# Patient Record
Sex: FEMALE | Race: WHITE | URBAN - NONMETROPOLITAN AREA
[De-identification: names, ages, dates, MRNs, and addresses within clinical notes are randomized per-mention and may not be internally consistent; named-entity substitution may affect disease eponyms.]

---

## 2017-06-12 RX ORDER — AMOXICILLIN 875 MG/1
875 TABLET, COATED ORAL 2 TIMES DAILY
Qty: 20 TABLET | Refills: 0 | Status: SHIPPED | OUTPATIENT
Start: 2017-06-12

## 2019-04-08 ENCOUNTER — OFFICE VISIT (OUTPATIENT)
Dept: PSYCHIATRY | Age: 39
End: 2019-04-08
Payer: COMMERCIAL

## 2019-04-08 VITALS
WEIGHT: 164 LBS | OXYGEN SATURATION: 97 % | HEIGHT: 64 IN | BODY MASS INDEX: 28 KG/M2 | HEART RATE: 109 BPM | SYSTOLIC BLOOD PRESSURE: 125 MMHG | DIASTOLIC BLOOD PRESSURE: 86 MMHG

## 2019-04-08 DIAGNOSIS — F41.9 ANXIETY: Primary | ICD-10-CM

## 2019-04-08 PROCEDURE — 90791 PSYCH DIAGNOSTIC EVALUATION: CPT | Performed by: PSYCHOLOGIST

## 2019-04-08 NOTE — PROGRESS NOTES
Behavioral Health Consultation  [place] Psy.D.  2019  3:04 PM      Time spent with Patient: 2:30-3:15  This is patient's first       Reason for Consult:  anxiety and possible ADHD  Referring Provider: No referring provider defined for this encounter. Patient was provided informed consent for the assessment clinic. Discussed with patient model of service to include the limits of confidentiality (i.e. abuse reporting, suicide intervention, etc.)   Discussed no show and late cancellation policy. Patient indicated understanding. Feedback given to referring provider. S:  Patient presents with complex symptom presentation. She states that she was first prescribed stimulant medication as an adult. She found that while taking the medication she could focus and was more productive at work and school. She stopped the medication because she was having heart palpitations and was worried given family history of heart disease (mom  at 52). Without medication, patient indicates that she is struggling to focus and accomplish tasks. However, she also reports significant symptoms of anxiety.   She reports that she worries frequently, that she is \"wound up like a clock,\" and that she has experienced anxiety attacks in the past.    O:  MSE:    Appearance    alert, cooperative  Appetite normal  Sleep disturbance No  Fatigue Yes  Loss of pleasure Yes  Impulsive behavior No  Speech    normal rate and normal volume  Mood    Euthymic   Affect    normal affect  Thought Content    intact  Thought Process    linear  Associations    logical connections  Attention/Concentration    intact  Morbid ideation No  Suicide Assessment    no suicidal ideation    History:    Medications:   Current Outpatient Medications   Medication Sig Dispense Refill    amoxicillin (AMOXIL) 875 MG tablet Take 1 tablet by mouth 2 times daily 20 tablet 0    amphetamine-dextroamphetamine (ADDERALL) 20 MG tablet   0    montelukast (SINGULAIR) 10 MG tablet   11     No current facility-administered medications for this visit. Social History:   Social History     Socioeconomic History    Marital status:      Spouse name: Not on file    Number of children: Not on file    Years of education: Not on file    Highest education level: Not on file   Occupational History    Not on file   Social Needs    Financial resource strain: Not on file    Food insecurity:     Worry: Not on file     Inability: Not on file    Transportation needs:     Medical: Not on file     Non-medical: Not on file   Tobacco Use    Smoking status: Current Every Day Smoker     Packs/day: 1.00    Smokeless tobacco: Never Used   Substance and Sexual Activity    Alcohol use: Yes    Drug use: No    Sexual activity: Not on file   Lifestyle    Physical activity:     Days per week: Not on file     Minutes per session: Not on file    Stress: Not on file   Relationships    Social connections:     Talks on phone: Not on file     Gets together: Not on file     Attends Mu-ism service: Not on file     Active member of club or organization: Not on file     Attends meetings of clubs or organizations: Not on file     Relationship status: Not on file    Intimate partner violence:     Fear of current or ex partner: Not on file     Emotionally abused: Not on file     Physically abused: Not on file     Forced sexual activity: Not on file   Other Topics Concern    Not on file   Social History Narrative    Not on file       TOBACCO:   reports that she has been smoking. She has been smoking about 1.00 pack per day. She has never used smokeless tobacco.  ETOH:   reports that she drinks alcohol. Family History:   Family History   Problem Relation Age of Onset    Cancer Maternal Grandfather         pancreatic         A:  Patient reports significant symptoms of anxiety and problems focusing.   It is difficult to determine from self report if these problems are due to anxiety or

## 2023-11-02 ENCOUNTER — HOSPITAL ENCOUNTER (EMERGENCY)
Age: 43
Discharge: HOME | End: 2023-11-02
Payer: COMMERCIAL

## 2023-11-02 ENCOUNTER — APPOINTMENT (OUTPATIENT)
Dept: GENERAL RADIOLOGY | Age: 43
End: 2023-11-02
Payer: COMMERCIAL

## 2023-11-02 VITALS
HEIGHT: 63 IN | HEART RATE: 82 BPM | TEMPERATURE: 97.7 F | WEIGHT: 135 LBS | SYSTOLIC BLOOD PRESSURE: 120 MMHG | RESPIRATION RATE: 16 BRPM | DIASTOLIC BLOOD PRESSURE: 86 MMHG | BODY MASS INDEX: 23.92 KG/M2 | OXYGEN SATURATION: 98 %

## 2023-11-02 DIAGNOSIS — S92.501A CLOSED FRACTURE OF PHALANX OF RIGHT FIFTH TOE, INITIAL ENCOUNTER: Primary | ICD-10-CM

## 2023-11-02 PROCEDURE — 6370000000 HC RX 637 (ALT 250 FOR IP): Performed by: PHYSICIAN ASSISTANT

## 2023-11-02 PROCEDURE — 99283 EMERGENCY DEPT VISIT LOW MDM: CPT

## 2023-11-02 PROCEDURE — 73630 X-RAY EXAM OF FOOT: CPT

## 2023-11-02 RX ORDER — HYDROCODONE BITARTRATE AND ACETAMINOPHEN 5; 325 MG/1; MG/1
1 TABLET ORAL ONCE
Status: COMPLETED | OUTPATIENT
Start: 2023-11-02 | End: 2023-11-02

## 2023-11-02 RX ADMIN — HYDROCODONE BITARTRATE AND ACETAMINOPHEN 1 TABLET: 5; 325 TABLET ORAL at 10:46

## 2023-11-02 ASSESSMENT — PAIN SCALES - GENERAL
PAINLEVEL_OUTOF10: 10
PAINLEVEL_OUTOF10: 7

## 2023-11-02 ASSESSMENT — PAIN DESCRIPTION - LOCATION
LOCATION: FOOT
LOCATION: FOOT

## 2023-11-02 ASSESSMENT — PAIN - FUNCTIONAL ASSESSMENT: PAIN_FUNCTIONAL_ASSESSMENT: 0-10

## 2023-11-02 ASSESSMENT — ENCOUNTER SYMPTOMS: COLOR CHANGE: 1

## 2023-11-02 ASSESSMENT — PAIN DESCRIPTION - ORIENTATION: ORIENTATION: RIGHT

## 2023-11-02 NOTE — ED PROVIDER NOTES
[facility] EMERGENCY DEPT  eMERGENCY dEPARTMENT eNCOUnter      Pt Name: Michael Sears  MRN: [id number]  [address] [facility] [place] 1980  Date of evaluation: 11/2/2023  Provider: Abimbola Casey       Chief Complaint   Patient presents with    Foot Injury     Pt states she hit foot on a  at home and now there is swelling and redness. HISTORY OF PRESENT ILLNESS   (Location/Symptom, Timing/Onset,Context/Setting, Quality, Duration, Modifying Factors, Severity)  Note limiting factors. Michael Sears is a 36 y.o. female with history of ADHD who presents to the emergency department with complaint of a foot injury. The patient states that she hit her right foot on a  at home. She notes swelling and redness around the great toe. She states that she had previously injured the foot about a week or 2 ago by stubbing it at a different time. She states that is actually feeling better and is only having pain locally at the great toe. She denies any associated crush injury. NursingNotes were reviewed. REVIEW OF SYSTEMS    (2-9 systems for level 4, 10 or more for level 5)     Review of Systems   Musculoskeletal:  Positive for arthralgias. Skin:  Positive for color change (bruising). Neurological:  Negative for numbness. All other systems reviewed and are negative. PAST MEDICALHISTORY     Past Medical History:   Diagnosis Date    ADHD (attention deficit hyperactivity disorder)     Left breast lump 7/17/2015         SURGICAL HISTORY     History reviewed. No pertinent surgical history.       CURRENT MEDICATIONS     Discharge Medication List as of 11/2/2023 12:24 PM          ALLERGIES     Cefzil [cefprozil]    FAMILY HISTORY       Family History   Problem Relation Age of Onset    Cancer Maternal Grandfather         pancreatic          SOCIAL HISTORY       Social History     Socioeconomic History    Marital status:      Spouse name: None    Number of children: None    Years of